# Patient Record
Sex: FEMALE | Race: WHITE | Employment: OTHER | URBAN - NONMETROPOLITAN AREA
[De-identification: names, ages, dates, MRNs, and addresses within clinical notes are randomized per-mention and may not be internally consistent; named-entity substitution may affect disease eponyms.]

---

## 2017-01-01 ENCOUNTER — OFFICE VISIT (OUTPATIENT)
Dept: PULMONOLOGY | Age: 57
End: 2017-01-01
Payer: MEDICARE

## 2017-01-01 ENCOUNTER — TELEPHONE (OUTPATIENT)
Dept: CASE MANAGEMENT | Age: 57
End: 2017-01-01

## 2017-01-01 ENCOUNTER — OFFICE VISIT (OUTPATIENT)
Dept: ONCOLOGY | Age: 57
End: 2017-01-01
Payer: MEDICARE

## 2017-01-01 ENCOUNTER — HOSPITAL ENCOUNTER (OUTPATIENT)
Dept: CT IMAGING | Age: 57
Discharge: HOME | End: 2017-08-23
Payer: MEDICARE

## 2017-01-01 VITALS
HEIGHT: 60 IN | HEART RATE: 107 BPM | OXYGEN SATURATION: 94 % | RESPIRATION RATE: 20 BRPM | DIASTOLIC BLOOD PRESSURE: 68 MMHG | BODY MASS INDEX: 51.04 KG/M2 | SYSTOLIC BLOOD PRESSURE: 118 MMHG | WEIGHT: 260 LBS

## 2017-01-01 VITALS
DIASTOLIC BLOOD PRESSURE: 86 MMHG | TEMPERATURE: 97.9 F | HEIGHT: 60 IN | OXYGEN SATURATION: 93 % | RESPIRATION RATE: 18 BRPM | SYSTOLIC BLOOD PRESSURE: 124 MMHG | BODY MASS INDEX: 51.04 KG/M2 | WEIGHT: 260 LBS | HEART RATE: 81 BPM

## 2017-01-01 VITALS — SYSTOLIC BLOOD PRESSURE: 124 MMHG | DIASTOLIC BLOOD PRESSURE: 84 MMHG | TEMPERATURE: 98.3 F | HEART RATE: 76 BPM

## 2017-01-01 DIAGNOSIS — J44.9 CHRONIC OBSTRUCTIVE PULMONARY DISEASE, UNSPECIFIED COPD TYPE (HCC): ICD-10-CM

## 2017-01-01 DIAGNOSIS — Z87.891 STOPPED SMOKING WITH GREATER THAN 40 PACK YEAR HISTORY: ICD-10-CM

## 2017-01-01 DIAGNOSIS — C50.919 INVASIVE DUCTAL CARCINOMA OF BREAST, UNSPECIFIED LATERALITY (HCC): Primary | ICD-10-CM

## 2017-01-01 DIAGNOSIS — R91.8 MULTIPLE NODULES OF LUNG: ICD-10-CM

## 2017-01-01 DIAGNOSIS — C50.919 INVASIVE DUCTAL CARCINOMA OF BREAST, UNSPECIFIED LATERALITY (HCC): ICD-10-CM

## 2017-01-01 DIAGNOSIS — G47.33 OBSTRUCTIVE SLEEP APNEA SYNDROME: Primary | ICD-10-CM

## 2017-01-01 DIAGNOSIS — F17.219 NICOTINE DEPENDENCE, CIGARETTES, WITH UNSPECIFIED NICOTINE-INDUCED DISORDERS: ICD-10-CM

## 2017-01-01 DIAGNOSIS — Z93.0 TRACHEOSTOMY PRESENT (HCC): Primary | ICD-10-CM

## 2017-01-01 DIAGNOSIS — Z12.31 SCREENING MAMMOGRAM, ENCOUNTER FOR: ICD-10-CM

## 2017-01-01 DIAGNOSIS — F25.8 OTHER SCHIZOAFFECTIVE DISORDERS (HCC): ICD-10-CM

## 2017-01-01 DIAGNOSIS — Z93.0 TRACHEOSTOMY PRESENT (HCC): ICD-10-CM

## 2017-01-01 PROCEDURE — G8417 CALC BMI ABV UP PARAM F/U: HCPCS | Performed by: INTERNAL MEDICINE

## 2017-01-01 PROCEDURE — 1036F TOBACCO NON-USER: CPT | Performed by: INTERNAL MEDICINE

## 2017-01-01 PROCEDURE — 3014F SCREEN MAMMO DOC REV: CPT | Performed by: INTERNAL MEDICINE

## 2017-01-01 PROCEDURE — G8926 SPIRO NO PERF OR DOC: HCPCS | Performed by: INTERNAL MEDICINE

## 2017-01-01 PROCEDURE — G0296 VISIT TO DETERM LDCT ELIG: HCPCS | Performed by: INTERNAL MEDICINE

## 2017-01-01 PROCEDURE — G8427 DOCREV CUR MEDS BY ELIG CLIN: HCPCS | Performed by: INTERNAL MEDICINE

## 2017-01-01 PROCEDURE — 3023F SPIROM DOC REV: CPT | Performed by: INTERNAL MEDICINE

## 2017-01-01 PROCEDURE — 99214 OFFICE O/P EST MOD 30 MIN: CPT | Performed by: INTERNAL MEDICINE

## 2017-01-01 PROCEDURE — G0297 LDCT FOR LUNG CA SCREEN: HCPCS

## 2017-01-01 PROCEDURE — G8484 FLU IMMUNIZE NO ADMIN: HCPCS | Performed by: INTERNAL MEDICINE

## 2017-01-01 PROCEDURE — 99213 OFFICE O/P EST LOW 20 MIN: CPT | Performed by: INTERNAL MEDICINE

## 2017-01-01 PROCEDURE — 3017F COLORECTAL CA SCREEN DOC REV: CPT | Performed by: INTERNAL MEDICINE

## 2017-01-01 RX ORDER — HYDROCHLOROTHIAZIDE 25 MG/1
TABLET ORAL
COMMUNITY

## 2017-01-01 RX ORDER — DIPHENHYDRAMINE HCL 25 MG
25 CAPSULE ORAL EVERY 8 HOURS PRN
COMMUNITY

## 2017-01-01 RX ORDER — DOCUSATE SODIUM 100 MG/1
CAPSULE, LIQUID FILLED ORAL
COMMUNITY
Start: 2013-11-01 | End: 2017-01-01 | Stop reason: SDUPTHER

## 2017-01-01 RX ORDER — CETIRIZINE HYDROCHLORIDE 10 MG/1
TABLET ORAL
COMMUNITY
Start: 2014-06-16

## 2017-01-01 RX ORDER — ATORVASTATIN CALCIUM 10 MG/1
TABLET, FILM COATED ORAL
COMMUNITY
Start: 2017-01-01 | End: 2017-01-01 | Stop reason: SDUPTHER

## 2017-01-01 RX ORDER — ALPRAZOLAM 0.5 MG/1
0.5 TABLET ORAL 3 TIMES DAILY PRN
COMMUNITY

## 2017-01-01 ASSESSMENT — ENCOUNTER SYMPTOMS
SHORTNESS OF BREATH: 1
EYES NEGATIVE: 1
GASTROINTESTINAL NEGATIVE: 1
SHORTNESS OF BREATH: 1
APNEA: 1
EYES NEGATIVE: 1
GASTROINTESTINAL NEGATIVE: 1
CHOKING: 1
BACK PAIN: 1
APNEA: 1

## 2017-02-08 ENCOUNTER — OFFICE VISIT (OUTPATIENT)
Age: 57
End: 2017-02-08

## 2017-02-08 VITALS
TEMPERATURE: 98 F | WEIGHT: 252 LBS | OXYGEN SATURATION: 94 % | RESPIRATION RATE: 16 BRPM | BODY MASS INDEX: 56.69 KG/M2 | DIASTOLIC BLOOD PRESSURE: 70 MMHG | HEART RATE: 93 BPM | SYSTOLIC BLOOD PRESSURE: 110 MMHG | HEIGHT: 56 IN

## 2017-02-08 DIAGNOSIS — R91.8 MULTIPLE NODULES OF LUNG: ICD-10-CM

## 2017-02-08 DIAGNOSIS — G47.33 OBSTRUCTIVE SLEEP APNEA SYNDROME: Primary | ICD-10-CM

## 2017-02-08 DIAGNOSIS — Z87.891 STOPPED SMOKING WITH GREATER THAN 40 PACK YEAR HISTORY: ICD-10-CM

## 2017-02-08 DIAGNOSIS — Z93.0 TRACHEOSTOMY PRESENT (HCC): ICD-10-CM

## 2017-02-08 DIAGNOSIS — J44.9 CHRONIC OBSTRUCTIVE PULMONARY DISEASE, UNSPECIFIED COPD TYPE (HCC): ICD-10-CM

## 2017-02-08 PROCEDURE — 3023F SPIROM DOC REV: CPT | Performed by: INTERNAL MEDICINE

## 2017-02-08 PROCEDURE — G8926 SPIRO NO PERF OR DOC: HCPCS | Performed by: INTERNAL MEDICINE

## 2017-02-08 PROCEDURE — 3017F COLORECTAL CA SCREEN DOC REV: CPT | Performed by: INTERNAL MEDICINE

## 2017-02-08 PROCEDURE — 3014F SCREEN MAMMO DOC REV: CPT | Performed by: INTERNAL MEDICINE

## 2017-02-08 PROCEDURE — 99213 OFFICE O/P EST LOW 20 MIN: CPT | Performed by: INTERNAL MEDICINE

## 2017-02-08 PROCEDURE — G8427 DOCREV CUR MEDS BY ELIG CLIN: HCPCS | Performed by: INTERNAL MEDICINE

## 2017-02-08 PROCEDURE — 1036F TOBACCO NON-USER: CPT | Performed by: INTERNAL MEDICINE

## 2017-02-08 PROCEDURE — G8484 FLU IMMUNIZE NO ADMIN: HCPCS | Performed by: INTERNAL MEDICINE

## 2017-02-08 PROCEDURE — G8417 CALC BMI ABV UP PARAM F/U: HCPCS | Performed by: INTERNAL MEDICINE

## 2017-02-08 ASSESSMENT — ENCOUNTER SYMPTOMS
GASTROINTESTINAL NEGATIVE: 1
COUGH: 1
EYES NEGATIVE: 1
SHORTNESS OF BREATH: 1

## 2017-03-01 ENCOUNTER — TELEPHONE (OUTPATIENT)
Dept: INTERNAL MEDICINE | Age: 57
End: 2017-03-01

## 2017-03-08 ENCOUNTER — OFFICE VISIT (OUTPATIENT)
Dept: ONCOLOGY | Age: 57
End: 2017-03-08

## 2017-03-08 VITALS
DIASTOLIC BLOOD PRESSURE: 74 MMHG | WEIGHT: 260.2 LBS | HEIGHT: 60 IN | BODY MASS INDEX: 51.08 KG/M2 | HEART RATE: 80 BPM | TEMPERATURE: 97.6 F | SYSTOLIC BLOOD PRESSURE: 118 MMHG

## 2017-03-08 DIAGNOSIS — C50.911 BREAST CANCER, STAGE 1, RIGHT (HCC): Primary | ICD-10-CM

## 2017-03-08 DIAGNOSIS — C50.919 INVASIVE DUCTAL CARCINOMA OF BREAST, UNSPECIFIED LATERALITY (HCC): Primary | ICD-10-CM

## 2017-03-08 DIAGNOSIS — C50.919 INVASIVE DUCTAL CARCINOMA OF BREAST, UNSPECIFIED LATERALITY (HCC): ICD-10-CM

## 2017-03-08 DIAGNOSIS — C50.019 MALIGNANT NEOPLASM OF NIPPLE OF BREAST IN FEMALE, UNSPECIFIED LATERALITY: ICD-10-CM

## 2017-03-08 DIAGNOSIS — Z12.31 SCREENING MAMMOGRAM, ENCOUNTER FOR: ICD-10-CM

## 2017-03-08 PROCEDURE — 99214 OFFICE O/P EST MOD 30 MIN: CPT | Performed by: INTERNAL MEDICINE

## 2017-03-08 PROCEDURE — 3017F COLORECTAL CA SCREEN DOC REV: CPT | Performed by: INTERNAL MEDICINE

## 2017-03-08 PROCEDURE — G8484 FLU IMMUNIZE NO ADMIN: HCPCS | Performed by: INTERNAL MEDICINE

## 2017-03-08 PROCEDURE — G8417 CALC BMI ABV UP PARAM F/U: HCPCS | Performed by: INTERNAL MEDICINE

## 2017-03-08 PROCEDURE — G8427 DOCREV CUR MEDS BY ELIG CLIN: HCPCS | Performed by: INTERNAL MEDICINE

## 2017-03-08 PROCEDURE — 3014F SCREEN MAMMO DOC REV: CPT | Performed by: INTERNAL MEDICINE

## 2017-03-08 PROCEDURE — 1036F TOBACCO NON-USER: CPT | Performed by: INTERNAL MEDICINE

## 2017-06-08 ENCOUNTER — OFFICE VISIT (OUTPATIENT)
Dept: ONCOLOGY | Age: 57
End: 2017-06-08
Payer: MEDICARE

## 2017-06-08 VITALS
DIASTOLIC BLOOD PRESSURE: 68 MMHG | HEART RATE: 64 BPM | SYSTOLIC BLOOD PRESSURE: 118 MMHG | HEIGHT: 60 IN | TEMPERATURE: 98 F

## 2017-06-08 DIAGNOSIS — C50.919 INVASIVE DUCTAL CARCINOMA OF BREAST, UNSPECIFIED LATERALITY (HCC): Primary | ICD-10-CM

## 2017-06-08 PROCEDURE — 3017F COLORECTAL CA SCREEN DOC REV: CPT | Performed by: INTERNAL MEDICINE

## 2017-06-08 PROCEDURE — 3014F SCREEN MAMMO DOC REV: CPT | Performed by: INTERNAL MEDICINE

## 2017-06-08 PROCEDURE — 1036F TOBACCO NON-USER: CPT | Performed by: INTERNAL MEDICINE

## 2017-06-08 PROCEDURE — G8427 DOCREV CUR MEDS BY ELIG CLIN: HCPCS | Performed by: INTERNAL MEDICINE

## 2017-06-08 PROCEDURE — 99214 OFFICE O/P EST MOD 30 MIN: CPT | Performed by: INTERNAL MEDICINE

## 2017-06-08 PROCEDURE — G8417 CALC BMI ABV UP PARAM F/U: HCPCS | Performed by: INTERNAL MEDICINE

## 2017-11-01 NOTE — PROGRESS NOTES
Subjective:      Patient ID: Steve Marquez is a 62 y.o. female. HPI  Follow-up visit for COPD, sleep apnea, and multiple pulmonary nodules. The patient had her low-dose screening CT scan of the chest done in late August.  The study showed a few scattered calcified nodules up to 5-6 mm in size all stable. Stable 1.5 cm left adrenal gland nodule dating back to 2012 which is most likely a benign adenoma. No suspicious mass lesions or nodules were identified. Category 2. Repeat LDCT in 12 months. The patient comes in for her trach change. She has very severe sleep apnea intolerant of CPAP. She caps her trach at night and uses a PMV during the day. Quit smoking a year ago. She is in a wheelchair. Not very active. Unfortunately weight is unchanged. Short of breath at rest and with minimal exertion. Undoubtedly, multifactorial.    Review of Systems   Constitutional: Negative. HENT: Negative. Eyes: Negative. Respiratory: Positive for apnea, choking and shortness of breath. Cardiovascular: Negative. Gastrointestinal: Negative. Musculoskeletal: Positive for back pain and gait problem. Psychiatric/Behavioral: Positive for dysphoric mood. All other systems reviewed and are negative. Objective:     Physical Exam   Constitutional: She is oriented to person, place, and time. She appears well-developed and well-nourished. Extreme obesity. HENT:   Head: Normocephalic. Mouth/Throat: Oropharynx is clear and moist.   Eyes: Conjunctivae are normal. No scleral icterus. Neck: Neck supple. No JVD present. No tracheal deviation present. No thyromegaly present. Short and thick neck. Trach stoma clean. Jasmyn Jacks changed to a #5 proximal XLT Shiley trach. Cardiovascular: Normal rate, regular rhythm and normal heart sounds. Exam reveals no gallop. No murmur heard. Pulmonary/Chest: She is in respiratory distress. She has no wheezes. She has no rales. She exhibits no tenderness. 8.3 g/dL    Alb 4.4 3.5 - 5.2 g/dL    Albumin/Globulin Ratio 1.5 1.0 - 2.5    GFR Non- 54 (L) >60 mL/min    GFR African American >60 >60 mL/min    GFR Comment          GFR Staging NOT REPORTED        Assessment:      1. Tracheostomy present ([facility]    2. Stopped smoking with greater than 40 pack year history    3. Multiple nodules of lung    4. Invasive ductal carcinoma of breast, unspecified laterality ([facility]    5. Other schizoaffective disorders ([facility]    6. Chronic obstructive pulmonary disease, unspecified COPD type ([facility]          Plan:      1. Strongly admonished her to lose weight. 2. Continue management of sleep apnea with tracheostomy. 3. Repeat low-dose screening CT scan of the chest next August.  4. Continue bronchodilators. 5. Encouraged flu shot. Patient declines. 6. Return in 3 months for trach change.       Electronically signed by Praneeth Mercedes DO on 11/1/2017 at 9:28 AM

## 2017-11-06 NOTE — PROGRESS NOTES
Taxotere carboplatin and Herceptin.     COPD (chronic obstructive pulmonary disease) (HCC)     HTN (hypertension)     Hyperlipidemia     Insulin resistance     Insulin resistance 9/10/2014    Poor hygiene     Schizoaffective disorder ([address]     TIA (transient ischemic attack) Oct. 2008    question of    Urinary incontinence        Past Surgical History:   Procedure Laterality Date    BREAST BIOPSY Left 07/19/2011    breast core biopsy    DILATION AND CURETTAGE OF UTERUS  1995    and hysteroscopy secondary to menometrorrhagia    DILATION AND CURETTAGE OF UTERUS  1993    and hysteroscopy secondary to menometrorrhagia   [address]    and endometrial resection secondary to menometrorrhagia    MASTECTOMY Left 08/15/2011    simple mastectomy with sentinel lymphadenopathy    OTHER SURGICAL HISTORY  4/24/2015    Port removal    TUBAL LIGATION  1980    TUNNELED VENOUS PORT PLACEMENT Right 10/19/2011       Allergies   Allergen Reactions    Codeine     Penicillins        Current Outpatient Prescriptions   Medication Sig Dispense Refill    insulin aspart (NOVOLOG) 100 UNIT/ML injection vial Inject into the skin 3 times daily (before meals) Indications: sliding scale      ALPRAZolam (XANAX) 0.5 MG tablet Take 0.5 mg by mouth 3 times daily as needed (anxiety)      diphenhydrAMINE (BENADRYL) 25 MG capsule Take 25 mg by mouth every 8 hours as needed for Itching      metFORMIN (GLUCOPHAGE) 850 MG tablet Take 1,000 mg by mouth 2 times daily       ipratropium-albuterol (DUONEB) 0.5-2.5 (3) MG/3ML SOLN nebulizer solution Take 1 vial by nebulization every 6 hours       omeprazole (PRILOSEC) 40 MG delayed release capsule Take 20 mg by mouth daily       Lactobacillus (ACIDOPHILUS) CAPS capsule Take 1 capsule by mouth daily      loratadine (CLARITIN) 10 MG tablet Take 10 mg by mouth daily      metoprolol tartrate (LOPRESSOR) 25 MG tablet Take 25 mg by mouth 2 times daily      oxybutynin (DITROPAN-XL) 10 MG CR tablet Take 10 mg by mouth daily       LISINOPRIL PO Take 10 mg by mouth daily      traMADol (ULTRAM) 50 MG tablet Take 1 tablet by mouth 2 times daily as needed (Patient taking differently: Take 50 mg by mouth 3 times daily ) 60 tablet 0    atorvastatin (LIPITOR) 20 MG tablet Take 20 mg by mouth nightly.  anastrozole (ARIMIDEX) 1 MG chemo tablet TAKE 1 TABLET EVERY DAY 30 tablet 5    Calcium Carbonate-Vitamin D (OYSTER SHELL CALCIUM/D) 500-200 MG-UNIT TABS TAKE 1 TABLET TWICE DAILY 60 tablet 4    clopidogrel (PLAVIX) 75 MG tablet Take 1 tablet by mouth daily. 30 tablet 6    ferrous sulfate 325 (65 FE) MG tablet Take 1 tablet by mouth daily. 30 tablet 6    aspirin 81 MG tablet Take 1 tablet by mouth daily. 30 tablet 6    docusate sodium (COLACE) 100 MG capsule Take 1 capsule by mouth 2 times daily. (Patient taking differently: Take 100 mg by mouth 2 times daily as needed ) 60 capsule 11    paliperidone (INVEGA) 6 MG ER tablet Take 6 mg by mouth every morning.  cetirizine (ZYRTEC) 10 MG tablet TAKE 1 TABLET BY MOUTH DAILY.  hydrochlorothiazide (HYDRODIURIL) 25 MG tablet Take 12.5 mg by mouth daily      LevoFLOXacin (LEVAQUIN PO) Take 500 mg by mouth Indications: for 7 days      famotidine (PEPCID) 20 MG tablet Take 20 mg by mouth daily       metaxalone (SKELAXIN) 800 MG tablet   Take 800 mg by mouth 2 times daily        No current facility-administered medications for this visit. Social History     Social History    Marital status:      Spouse name: N/A    Number of children: N/A    Years of education: N/A     Occupational History    Not on file.      Social History Main Topics    Smoking status: Former Smoker     Packs/day: 1.00     Years: 30.00     Types: Cigarettes    Smokeless tobacco: Former User    Alcohol use No    Drug use: No    Sexual activity: Not on file     Other Topics Concern    Not on file     Social History Narrative    No narrative on file       Family History   Problem Relation Age of Onset    Hypertension Mother     Diabetes Father         REVIEW OF SYSTEM:     Constitutional: No fever or chills. No night sweats, no weight loss   Eyes: No eye discharge, double vision, or eye pain   HEENT: negative for sore mouth, sore throat, hoarseness and voice change   Respiratory: negative for cough , sputum, dyspnea, wheezing, hemoptysis, chest pain   Cardiovascular: negative for chest pain, dyspnea, palpitations, orthopnea, PND   Gastrointestinal: negative for nausea, vomiting, diarrhea, constipation, abdominal pain, Dysphagia, hematemesis and hematochezia   Genitourinary: negative for frequency, dysuria, nocturia, urinary incontinence, and hematuria   Integument: negative for rash, skin lesions, bruises.    Hematologic/Lymphatic: negative for easy bruising, bleeding, lymphadenopathy, petechiae and swelling/edema   Endocrine: negative for heat or cold intolerance, tremor, weight changes, change in bowel habits and hair loss   Musculoskeletal: negative for myalgias, arthralgias, pain, joint swelling,and bone pain   Neurological: negative for headaches, dizziness, seizures, weakness, numbness       OBJECTIVE:         Vitals:    11/06/17 1417   BP: 124/84   Pulse: 76   Temp: 98.3 °F (36.8 °C)   PHYSICAL EXAM:   General appearance - obese appearing, no in pain or distress   Mental status - alert and cooperative   Eyes - pupils equal and reactive, extraocular eye movements intact   Mouth - mucous membranes moist, pharynx normal without lesions   Neck - tracheostomy in place  Lymphatics - no palpable lymphadenopathy, no hepatosplenomegaly   Chest - clear to auscultation, no wheezes, rales or rhonchi, symmetric air entry   Surgical absence of left breast, no lumps in the right breast  Heart - normal rate, regular rhythm, normal S1, S2, no murmurs, rubs, clicks or gallops   Abdomen - soft, nontender, nondistended, no masses

## 2018-01-01 ENCOUNTER — OFFICE VISIT (OUTPATIENT)
Dept: PULMONOLOGY | Age: 58
End: 2018-01-01
Payer: MEDICARE

## 2018-01-01 ENCOUNTER — TELEPHONE (OUTPATIENT)
Dept: CASE MANAGEMENT | Age: 58
End: 2018-01-01

## 2018-01-01 VITALS
WEIGHT: 260 LBS | HEIGHT: 60 IN | DIASTOLIC BLOOD PRESSURE: 72 MMHG | SYSTOLIC BLOOD PRESSURE: 116 MMHG | BODY MASS INDEX: 51.04 KG/M2 | RESPIRATION RATE: 18 BRPM | HEART RATE: 90 BPM | OXYGEN SATURATION: 93 %

## 2018-01-01 VITALS
HEIGHT: 60 IN | DIASTOLIC BLOOD PRESSURE: 80 MMHG | RESPIRATION RATE: 18 BRPM | BODY MASS INDEX: 51.04 KG/M2 | SYSTOLIC BLOOD PRESSURE: 136 MMHG | WEIGHT: 260 LBS | HEART RATE: 72 BPM | OXYGEN SATURATION: 99 %

## 2018-01-01 DIAGNOSIS — R91.8 MULTIPLE NODULES OF LUNG: ICD-10-CM

## 2018-01-01 DIAGNOSIS — G47.33 OBSTRUCTIVE SLEEP APNEA SYNDROME: ICD-10-CM

## 2018-01-01 DIAGNOSIS — J44.9 CHRONIC OBSTRUCTIVE PULMONARY DISEASE, UNSPECIFIED COPD TYPE (HCC): ICD-10-CM

## 2018-01-01 DIAGNOSIS — Z87.891 STOPPED SMOKING WITH GREATER THAN 40 PACK YEAR HISTORY: ICD-10-CM

## 2018-01-01 DIAGNOSIS — Z93.0 TRACHEOSTOMY PRESENT (HCC): Primary | ICD-10-CM

## 2018-01-01 DIAGNOSIS — E66.01 MORBID OBESITY WITH BMI OF 50.0-59.9, ADULT (HCC): ICD-10-CM

## 2018-01-01 DIAGNOSIS — Z87.891 PERSONAL HISTORY OF TOBACCO USE: ICD-10-CM

## 2018-01-01 PROCEDURE — G8427 DOCREV CUR MEDS BY ELIG CLIN: HCPCS | Performed by: NURSE PRACTITIONER

## 2018-01-01 PROCEDURE — 3017F COLORECTAL CA SCREEN DOC REV: CPT | Performed by: NURSE PRACTITIONER

## 2018-01-01 PROCEDURE — 1036F TOBACCO NON-USER: CPT | Performed by: NURSE PRACTITIONER

## 2018-01-01 PROCEDURE — 99213 OFFICE O/P EST LOW 20 MIN: CPT | Performed by: NURSE PRACTITIONER

## 2018-01-01 PROCEDURE — G8926 SPIRO NO PERF OR DOC: HCPCS | Performed by: NURSE PRACTITIONER

## 2018-01-01 PROCEDURE — G0296 VISIT TO DETERM LDCT ELIG: HCPCS | Performed by: NURSE PRACTITIONER

## 2018-01-01 PROCEDURE — 3023F SPIROM DOC REV: CPT | Performed by: NURSE PRACTITIONER

## 2018-01-01 PROCEDURE — G8417 CALC BMI ABV UP PARAM F/U: HCPCS | Performed by: NURSE PRACTITIONER

## 2018-01-01 ASSESSMENT — ENCOUNTER SYMPTOMS
ALLERGIC/IMMUNOLOGIC NEGATIVE: 1
EYES NEGATIVE: 1
EYES NEGATIVE: 1
GASTROINTESTINAL NEGATIVE: 1
GASTROINTESTINAL NEGATIVE: 1
SHORTNESS OF BREATH: 1
ALLERGIC/IMMUNOLOGIC NEGATIVE: 1

## 2018-03-14 NOTE — PROGRESS NOTES
Subjective:      Patient ID: Joann Tapia is a 62 y.o. female. HPI:  Here for follow up for COPD, ROMY intolerant of CPAP, multiple pulmonary nodules, and trach exchange. She is sitting in a wheelchair and has  5XLT cuffless trach which was exchanged with a 5XLT cuffless trach today in the office without difficulty. She denies increased or worsening shortness of breath, but upon further questioning admits to SOB with exertion, denies cough, sputum and wheeze. She is due for a repeat CT chst in August to monitor pulmonary nodules. ROS limited by trach and patient non-response to questions. Review of Systems   Constitutional: Negative. HENT: Negative. Eyes: Negative. Respiratory: Positive for shortness of breath. With activity   Cardiovascular: Negative. Gastrointestinal: Negative. Endocrine: Negative. Genitourinary: Negative. Musculoskeletal: Negative. Skin: Negative. Allergic/Immunologic: Negative. Neurological: Negative. Hematological: Negative. Psychiatric/Behavioral: Negative. Objective:     Physical Exam  General appearance - alert, well appearing, and in no distress, oriented to person, place, and time and overweight, morbidly obese  Mental status - alert, unable to assess orientation due to not answering questions. Eyes - pupils equal and reactive, extraocular eye movements intact  Ears - not examined  Nose - normal and patent, no erythema, discharge or polyps  Mouth - mucous membranes moist, pharynx normal without lesions  Neck - supple, no significant adenopathy. Blanquita Simon is midline, trach exchanged in office today with obturator. 5 XLT cuffless shiley, small amount of pale yellow mucous noted.    Chest - clear to auscultation, no wheezes, rales or rhonchi, symmetric air entry, noted to have diminished lung bases, and a right prosthetic breast.   Heart - normal rate, regular rhythm, normal S1, S2, no murmurs, rubs, clicks or gallops  Abdomen - do not lead to a diagnosis of cancer. Usually further imaging can resolve most false-positive results; however, some patients may require invasive procedures. Over diagnosis risk - 10% to 12% of screen-detected lung cancer cases are over diagnosedthat is, the cancer would not have been detected in the patient's lifetime without the screening. Need for follow up screens annually to continue lung cancer screening effectiveness     Risks associated with radiation from annual LDCT- Radiation exposure is about the same as for a mammogram, which is about 1/3 of the annual background radiation exposure from everyday life. Starting screening at age 54 is not likely to increase cancer risk from radiation exposure. Patients with comorbidities resulting in life expectancy of < 10 years, or that would preclude treatment of an abnormality identified on CT, should not be screened due to lack of benefit.     To obtain maximal benefit from this screening, smoking cessation and long-term abstinence from smoking is critical

## 2018-03-15 NOTE — TELEPHONE ENCOUNTER
Order received for annaul CT lung screening. EMR and order reviewed. Scheduling notified to contact patient and schedule on or shortly after 8/23/18. Information regarding ct lung screening and smoking cessation referral mailed to patient.

## 2018-08-27 ENCOUNTER — TELEPHONE (OUTPATIENT)
Dept: ONCOLOGY | Age: 58
End: 2018-08-27